# Patient Record
(demographics unavailable — no encounter records)

---

## 2024-10-10 NOTE — PHYSICAL EXAM
[Chaperone Present] : A chaperone was present in the examining room during all aspects of the physical examination [No Lymphadenopathy] : no lymphadenopathy [Soft] : soft [Non-tender] : non-tender [No HSM] : No HSM [No Mass] : no mass [Nl Sphincter Tone] : normal sphincter tone [13569] : A chaperone was present during the pelvic exam. [Examination Of The Breasts] : a normal appearance [No Masses] : no breast masses were palpable [Labia Majora] : normal [Labia Minora] : normal [Normal] : normal [Retroversion] : retroverted [Enlarged ___ wks] : enlarged [unfilled] ~Uweeks [Uterine Adnexae] : normal [FreeTextEntry2] : Shana [FreeTextEntry3] : Dominant left thyroid nodule has been bx'd. [FreeTextEntry7] : Laparoscopy scars [FreeTextEntry5] : Pap done [FreeTextEntry9] : No masses

## 2024-10-10 NOTE — PHYSICAL EXAM
[Chaperone Present] : A chaperone was present in the examining room during all aspects of the physical examination [No Lymphadenopathy] : no lymphadenopathy [Soft] : soft [Non-tender] : non-tender [No HSM] : No HSM [No Mass] : no mass [Nl Sphincter Tone] : normal sphincter tone [30721] : A chaperone was present during the pelvic exam. [Examination Of The Breasts] : a normal appearance [No Masses] : no breast masses were palpable [Labia Majora] : normal [Labia Minora] : normal [Normal] : normal [Retroversion] : retroverted [Enlarged ___ wks] : enlarged [unfilled] ~Uweeks [Uterine Adnexae] : normal [FreeTextEntry2] : Shana [FreeTextEntry3] : Dominant left thyroid nodule has been bx'd. [FreeTextEntry7] : Laparoscopy scars [FreeTextEntry5] : Pap done [FreeTextEntry9] : No masses

## 2024-10-10 NOTE — HISTORY OF PRESENT ILLNESS
[Regular Cycle Intervals] : periods have been regular [Previously active] : previously active [Men] : men [Vaginal] : vaginal [No] : No [TextBox_4] :  ( - missed ab) LMP 9/15/24 for routine gynecologic care. Hx of fibroids.  2019 - Laparoscopic myomectomy - NYP - several removed; still has fibroids. Feels well. No gyn complaints. Nomenstrual issues - less painful as time has passed. Normal bladder & bowel function. 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 Hellerman's office in [place].  Based upon FH of colon ca, pt had genentic testng that was neg. [Mammogramdate] : 9/2024 [TextBox_19] : Dense [BreastSonogramDate] : 9/2024 [ColonoscopyDate] : 2017 & 2022 [TextBox_43] : Seeing Dr. LAYLA Ponce today - 10/10/24 [FreeTextEntry1] : 9/15/24 [FreeTextEntry2] : STD scr declined for now.

## 2024-10-11 NOTE — HISTORY OF PRESENT ILLNESS
[FreeTextEntry1] : Ms. YADIRA GROVER is a 44-year-old female with a PMH of thyroid nodules. PSH: Tonsillectomy (childhood), Myomectomy (2019), Bunionectomy (2024, 2023). Denies issues anesthesia.   Patient presents for pre-colonoscopy evaluation. Referred to Dr. Ponce for CRC screening. Last colonoscopy in 2022 at [facility] notable for 5 polyps, largest was 5mm in size (pathology not available at time of visit). She had another colonoscopy in 2017 that was normal, no colon polyps. Started early screening due to FH of CRC.  Prior CRC screening such as Cologuard, FIT test, CT colonography: No   Denies change in bowel habits, constipation, N/V/D, rectal bleeding, abdominal pain, bloating, unintentional weight loss, early satiety, dysphagia. Denies heartburn or reflux on a regular basis. Denies bloody or black stools. BM every 1-2 days; soft, brown stools that are easily passed.   Family history: Paternal aunt diagnosed with CRC age 35. Paternal grandmother diagnosed with CRC in her 60s.  Father had polyps that were benign.  Patient had genetic testing years ago that was negative.  Denies family history of IBD or celiac disease.   Social: Smoking history: Never Alcohol consumption: Occasional  Marijuana use: None Other recreational drug use: None

## 2024-10-11 NOTE — ASSESSMENT
[FreeTextEntry1] : Colonoscopy with Dr. Ponce - Patient will try to obtain genetic screening record and pathology report from last colonoscopy in patient portal.  - Indications: Screening due to history of polyps. - Reviewed importance of adequate colon cleansing prior to colonoscopy. Instructed to contact office if he/she has any questions regarding prep. - Explained the risks (including but not limited to cardiopulmonary anesthetic complications, bleeding, perforation, aspiration, missed lesions and misidentified sites of lesions - complications that might necessitate hospitalization or surgery), benefits and alternatives of colonoscopy were reviewed with the patient. Preparation for the procedure was reviewed with the patient. The patient was informed that she/he would be given intravenous anesthesia by an anesthesiologist for the procedure. The patient was informed that a family member or friend must drive the patient following recovery from the procedure. Patient understands and agrees, all questions answered. - Holds: None - Prep: Clenpiq ordered, but not covered by insurance, may do Miralax.

## 2024-10-11 NOTE — HISTORY OF PRESENT ILLNESS
[FreeTextEntry1] : Ms. YADIRA GROVER is a 44-year-old female with a PMH of thyroid nodules. PSH: Tonsillectomy (childhood), Myomectomy (2019), Bunionectomy (2024, 2023). Denies issues anesthesia.   Patient presents for pre-colonoscopy evaluation. Referred to Dr. Ponce for CRC screening. Last colonoscopy in 2022 at [facility] notable for 5 polyps, largest was 5mm in size (pathology not available at time of visit). She had another colonoscopy in 2017 that was normal, no colon polyps. Started early screening due to FH of CRC.  Prior CRC screening such as Cologuard, FIT test, CT colonography: No   Denies change in bowel habits, constipation, N/V/D, rectal bleeding, abdominal pain, bloating, unintentional weight loss, early satiety, dysphagia. Denies heartburn or reflux on a regular basis. Denies bloody or black stools. BM every 1-2 days; soft, brown stools that are easily passed.   Family history: Paternal aunt diagnosed with CRC age 35. Paternal grandmother diagnosed with CRC in her 60s.  Father had polyps that were benign.  Patient had genetic testing years ago that was negative.  Denies family history of IBD or celiac disease.   Social: Smoking history: Never Alcohol consumption: Occasional  Marijuana use: None Other recreational drug use: None

## 2024-10-13 NOTE — HISTORY OF PRESENT ILLNESS
[FreeTextEntry1] : Patient denies any history of congenital heart disease or childhood cardiac illnesses  She has been having fainting spells since her teenage years -> had work up by neuro -> ? seizures In her late 20s, she saw a cardiologist -. had a positive tilt -.told of vaso-vagal -. she tries to keep hydrated, carries electrolyte tabs with her Last episode was in 9/23 -.> saw a different cardiologist at [facility] -. echo and holter in 12/23 -. found with mild mitral and tricuspid prolapse  No CP, SOB, PND or orthopnea No TAQUERIA No snoring  Had one episode of palpitations 3 ya while walking -. passed after a few seconds No recurrence'  She exercises 3-4 times a week -. cardio, weights

## 2024-10-13 NOTE — REASON FOR VISIT
[Structural Heart and Valve Disease] : structural heart and valve disease [FreeTextEntry3] : Dr Marily Whitlock

## 2025-07-01 NOTE — PROCEDURE
[FreeTextEntry6] : YADIRA GROVER is complaining of dynamic rhytids of the face and desires botulinum toxin for temporary reduction in these rhytids.  The risks benefits alternatives limitations and permanent scars were outlined with her . Under aseptic conditions, Botulinum Toxin was administered in the desired area. Please see face sheet for lot , site and dose information.   Please see the scanned face sheet for lot and dose information. Aseptic administration of botox to indicated areas of the face.  See external sheet for lot and dose information

## 2025-07-01 NOTE — ASSESSMENT
[FreeTextEntry1] : YADIRA GROVER  was here for procedural Botox injection.  She  tolerated the procedure well and will return for next dose in 4-5 months.  Instructions were reviewed.